# Patient Record
(demographics unavailable — no encounter records)

---

## 2024-11-05 NOTE — DISCUSSION/SUMMARY
[de-identified] : - we again discussed the etiology/ pathophysiology of the patient's complaints today in layman's terms - we reviewed MRI findings and images again today - reviewed treatment options, conservative and operative as well as the indications for each - discussed conservative treatment options including the role for anti-inflammatory medications, injections, bracing and therapy - reviewed risks, benefits and alternatives to these - activity modifications - continue OT - wean from brace - NSAIDs prn pain, Mobic Rx sent today - f/u in 3 weeks   My cumulative time spent on this patient's visit included: Preparation for the visit, review of the medical records, review of pertinent diagnostic studies, examination and counseling of the patient on the above diagnosis, treatment plan and prognosis, orders of diagnostic tests, medications and/or appropriate procedures and documentation in the medical records of today's visit.

## 2024-11-05 NOTE — DISCUSSION/SUMMARY
[de-identified] : - we again discussed the etiology/ pathophysiology of the patient's complaints today in layman's terms - we reviewed MRI findings and images again today - reviewed treatment options, conservative and operative as well as the indications for each - discussed conservative treatment options including the role for anti-inflammatory medications, injections, bracing and therapy - reviewed risks, benefits and alternatives to these - activity modifications - continue OT - wean from brace - NSAIDs prn pain, Mobic Rx sent today - f/u in 3 weeks   My cumulative time spent on this patient's visit included: Preparation for the visit, review of the medical records, review of pertinent diagnostic studies, examination and counseling of the patient on the above diagnosis, treatment plan and prognosis, orders of diagnostic tests, medications and/or appropriate procedures and documentation in the medical records of today's visit.

## 2024-11-05 NOTE — IMAGING
[de-identified] : Right wrist No atrophy No ecchymosis, swelling or wounds Normal muscle tone/ bulk NTTP along the first dorsal compartment TTP overlying ECU No obvious ECU snapping or subluxation Full symmetric wrist ROM with pain at terminal motion Mild pain reproduced in the dorsal ulnar head with resisted wrist extension Able to make a painless composite fist +AIN/ PIN/ Ulnar n SILT throughout fingers wwp + Fovea sign + TFCC grind - CMC grind - Ballottement - Piano key sign - Finkelstein's  3 views bilateral wrist: No acute fractures, mild ulnar positive variance MRI right wrist (9/18/24): Pinhole tear central disc TFCC.  Sprain with partial tear sigmoid and styloid attachments of TFCC with evidence of DRUJ effusion.  Sprain with partial tear dorsal distal radial ulnar ligament.  Dorsal offset ulnar in reference to radius suggesting possible joint instability.  Extensor carpi ulnaris tendinosis.

## 2024-11-05 NOTE — HISTORY OF PRESENT ILLNESS
[9] : 9 [0] : 0 [Localized] : localized [Stabbing] : stabbing [Constant] : constant [Household chores] : household chores [Leisure] : leisure [Work] : work [Social interactions] : social interactions [Physical therapy] : physical therapy [Full time] : Work status: full time [Other:____] : [unfilled] [de-identified] : 10/30/24: The patient returns today for repeat evaluation of her right ulnar-sided wrist pain.  She has been working with physical therapy for a month with minimal improvement in her symptoms.  She is increasingly limited due to this pain and has difficulty picking up her toddler.   11/08/2023 ALECIA COVINGTON is here for Location: Bilateral wrists Complaint: The patient reports a long history of a left volar radial wrist mass which is nontender and not especially bothersome.  She saw a dermatologist who recommended orthopedic evaluation.  Overall she is not really bothered by this.  More notably however patient notes left ulnar-sided wrist pain which began roughly 6 months ago without antecedent event.  She notes pain and difficulty with lifting her 86-ybwml-xil son.  Since its onset she believes it is worsened somewhat and now is beginning to interfere with some of her daily activities.  No radiating pain, numbness or tingling. 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

## 2024-11-05 NOTE — IMAGING
[de-identified] : Right wrist No atrophy No ecchymosis, swelling or wounds Normal muscle tone/ bulk NTTP along the first dorsal compartment TTP overlying ECU No obvious ECU snapping or subluxation Full symmetric wrist ROM with pain at terminal motion Mild pain reproduced in the dorsal ulnar head with resisted wrist extension Able to make a painless composite fist +AIN/ PIN/ Ulnar n SILT throughout fingers wwp + Fovea sign + TFCC grind - CMC grind - Ballottement - Piano key sign - Finkelstein's  3 views bilateral wrist: No acute fractures, mild ulnar positive variance MRI right wrist (9/18/24): Pinhole tear central disc TFCC.  Sprain with partial tear sigmoid and styloid attachments of TFCC with evidence of DRUJ effusion.  Sprain with partial tear dorsal distal radial ulnar ligament.  Dorsal offset ulnar in reference to radius suggesting possible joint instability.  Extensor carpi ulnaris tendinosis.

## 2024-11-05 NOTE — HISTORY OF PRESENT ILLNESS
[9] : 9 [0] : 0 [Localized] : localized [Stabbing] : stabbing [Constant] : constant [Household chores] : household chores [Leisure] : leisure [Work] : work [Social interactions] : social interactions [Physical therapy] : physical therapy [Full time] : Work status: full time [Other:____] : [unfilled] [de-identified] : 10/30/24: The patient returns today for repeat evaluation of her right ulnar-sided wrist pain.  She has been working with physical therapy for a month with minimal improvement in her symptoms.  She is increasingly limited due to this pain and has difficulty picking up her toddler.   11/08/2023 ALECIA COVINGTON is here for Location: Bilateral wrists Complaint: The patient reports a long history of a left volar radial wrist mass which is nontender and not especially bothersome.  She saw a dermatologist who recommended orthopedic evaluation.  Overall she is not really bothered by this.  More notably however patient notes left ulnar-sided wrist pain which began roughly 6 months ago without antecedent event.  She notes pain and difficulty with lifting her 31-bosbc-fdr son.  Since its onset she believes it is worsened somewhat and now is beginning to interfere with some of her daily activities.  No radiating pain, numbness or tingling. 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

## 2024-11-22 NOTE — IMAGING
[de-identified] : Right wrist No ecchymosis, swelling or wounds Normal muscle tone/ bulk TTP at the pisiform and pisotriquetral joint NTTP along the first dorsal compartment Minimally TTP overlying ECU No obvious ECU snapping or subluxation Full symmetric wrist ROM No pain reproduced in the dorsal ulnar head with resisted wrist extension Moderate pain reproduced with resisted wrist flexion and ulnar deviation Able to make a painless composite fist +AIN/ PIN/ Ulnar n SILT throughout fingers wwp - Fovea sign - TFCC grind - ECU Synergy test  + Pisotriquetral grind - Ballottement - Piano key sign - Finkelstein's  3 views bilateral wrist: No acute fractures, mild ulnar positive variance MRI right wrist (9/18/24): Pinhole tear central disc TFCC.  Sprain with partial tear sigmoid and styloid attachments of TFCC with evidence of DRUJ effusion.  Sprain with partial tear dorsal distal radial ulnar ligament.  Dorsal offset ulnar in reference to radius suggesting possible joint instability.  Extensor carpi ulnaris tendinosis.

## 2024-11-22 NOTE — HISTORY OF PRESENT ILLNESS
[9] : 9 [0] : 0 [Localized] : localized [Stabbing] : stabbing [Constant] : constant [Household chores] : household chores [Leisure] : leisure [Work] : work [Social interactions] : social interactions [Physical therapy] : physical therapy [Full time] : Work status: full time [Other:____] : [unfilled] [de-identified] : 11/20/24: The patient returns today for repeat evaluation of her right ulnar-sided wrist pain.  She has been working with physical therapy and does see slow progress in terms of her previous pain.   11/08/2023 ALECIA COVINGTON is here for Location: Bilateral wrists Complaint: The patient reports a long history of a left volar radial wrist mass which is nontender and not especially bothersome.  She saw a dermatologist who recommended orthopedic evaluation.  Overall she is not really bothered by this.  More notably however patient notes left ulnar-sided wrist pain which began roughly 6 months ago without antecedent event.  She notes pain and difficulty with lifting her 70-kfqsc-zza son.  Since its onset she believes it is worsened somewhat and now is beginning to interfere with some of her daily activities.  No radiating pain, numbness or tingling. 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

## 2024-11-22 NOTE — HISTORY OF PRESENT ILLNESS
[9] : 9 [0] : 0 [Localized] : localized [Stabbing] : stabbing [Constant] : constant [Household chores] : household chores [Leisure] : leisure [Work] : work [Social interactions] : social interactions [Physical therapy] : physical therapy [Full time] : Work status: full time [Other:____] : [unfilled] [de-identified] : 11/20/24: The patient returns today for repeat evaluation of her right ulnar-sided wrist pain.  She has been working with physical therapy and does see slow progress in terms of her previous pain.   11/08/2023 ALECIA COVINGTON is here for Location: Bilateral wrists Complaint: The patient reports a long history of a left volar radial wrist mass which is nontender and not especially bothersome.  She saw a dermatologist who recommended orthopedic evaluation.  Overall she is not really bothered by this.  More notably however patient notes left ulnar-sided wrist pain which began roughly 6 months ago without antecedent event.  She notes pain and difficulty with lifting her 86-fklun-rek son.  Since its onset she believes it is worsened somewhat and now is beginning to interfere with some of her daily activities.  No radiating pain, numbness or tingling. 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

## 2024-11-22 NOTE — PROCEDURE
[FreeTextEntry2] : Right pisiform bursitis [FreeTextEntry1] : Corticosteroid injection [FreeTextEntry3] : The risks and benefits of steroid injections were discussed. Verbal consent was obtained,  The site was prepped in a sterile fashion with alcohol A combination of 6 mg (1cc) of Celestone and 2cc 1% xylocaine were injected under sterile conditions at the level of the right pisotriquetral joint Patient tolerated the procedure without complication and was counseled on post injection expectations.

## 2024-11-22 NOTE — DISCUSSION/SUMMARY
[de-identified] : - we again discussed the etiology/ pathophysiology of the patient's complaints today in layman's terms - we reviewed MRI findings and images again today - reviewed treatment options, conservative and operative as well as the indications for each - discussed conservative treatment options including the role for anti-inflammatory medications, injections, bracing and therapy - reviewed risks, benefits and alternatives to these - patient requests right wrist CSI, tolerated without complication - activity modifications - continue OT - NSAIDs prn pain, Mobic - f/u in 2 weeks   My cumulative time spent on this patient's visit included: Preparation for the visit, review of the medical records, review of pertinent diagnostic studies, examination and counseling of the patient on the above diagnosis, treatment plan and prognosis, orders of diagnostic tests, medications and/or appropriate procedures and documentation in the medical records of today's visit.

## 2024-11-22 NOTE — IMAGING
[de-identified] : Right wrist No ecchymosis, swelling or wounds Normal muscle tone/ bulk TTP at the pisiform and pisotriquetral joint NTTP along the first dorsal compartment Minimally TTP overlying ECU No obvious ECU snapping or subluxation Full symmetric wrist ROM No pain reproduced in the dorsal ulnar head with resisted wrist extension Moderate pain reproduced with resisted wrist flexion and ulnar deviation Able to make a painless composite fist +AIN/ PIN/ Ulnar n SILT throughout fingers wwp - Fovea sign - TFCC grind - ECU Synergy test  + Pisotriquetral grind - Ballottement - Piano key sign - Finkelstein's  3 views bilateral wrist: No acute fractures, mild ulnar positive variance MRI right wrist (9/18/24): Pinhole tear central disc TFCC.  Sprain with partial tear sigmoid and styloid attachments of TFCC with evidence of DRUJ effusion.  Sprain with partial tear dorsal distal radial ulnar ligament.  Dorsal offset ulnar in reference to radius suggesting possible joint instability.  Extensor carpi ulnaris tendinosis.

## 2024-11-22 NOTE — DISCUSSION/SUMMARY
[de-identified] : - we again discussed the etiology/ pathophysiology of the patient's complaints today in layman's terms - we reviewed MRI findings and images again today - reviewed treatment options, conservative and operative as well as the indications for each - discussed conservative treatment options including the role for anti-inflammatory medications, injections, bracing and therapy - reviewed risks, benefits and alternatives to these - patient requests right wrist CSI, tolerated without complication - activity modifications - continue OT - NSAIDs prn pain, Mobic - f/u in 2 weeks   My cumulative time spent on this patient's visit included: Preparation for the visit, review of the medical records, review of pertinent diagnostic studies, examination and counseling of the patient on the above diagnosis, treatment plan and prognosis, orders of diagnostic tests, medications and/or appropriate procedures and documentation in the medical records of today's visit.

## 2024-12-02 NOTE — PHYSICAL EXAM
[Appropriately responsive] : appropriately responsive [Alert] : alert [No Acute Distress] : no acute distress [Soft] : soft [Non-tender] : non-tender [Non-distended] : non-distended [No Lesions] : no lesions [No Mass] : no mass [Oriented x3] : oriented x3 [Examination Of The Breasts] : a normal appearance [___cm] : a ~M [unfilled] ~Ucm inferior lateral quadrant mass was palpated [Deep] : deep [Firm] : firm [Fixed] : fixed [Nontender] : nontender [5:00] : in the 5:00 position [___cm Radial Distance from the Nipple] : [unfilled] ~Ucm radially from the nipple [No Masses] : no breast masses were palpable [Labia Majora] : normal [Labia Minora] : normal [Normal] : normal [Uterine Adnexae] : normal

## 2024-12-02 NOTE — DISCUSSION/SUMMARY
[FreeTextEntry1] : discussed color testing referred to breast specialist. rto in 1 year or soon for any gyn issues. pt verbalized understanding.

## 2024-12-02 NOTE — HISTORY OF PRESENT ILLNESS
[Patient reported breast sonogram was normal] : Patient reported breast sonogram was normal [Patient reported PAP Smear was normal] : Patient reported PAP Smear was normal [Normal Amount/Duration] :  normal amount and duration [No] : Patient does not have concerns regarding sex [Patient refuses STI testing] : Patient refuses STI testing [FreeTextEntry1] : 29 y/o  female, LMP: 24, presents for annual GYN visit. Patient denies any GYN complaints.  was doing breast sono q 6 mth to watch cysts of left breast.  Last imaging 6 mths ago and was advised no further eval.  Menstrual Cycle: regular, monthly, mild pain and bleeding. Sexual Activity: monogamous with . Contraception: condoms Social/Mental Health: reports social ETOH, denies tobacco or any illicit drug use. Denies depression, anxiety, thoughts of personal harm or suicidal ideation.  ROS: Denies fever/chills, HA, Cough/sore throat, CP, SOB, N/V, Diarrhea/Constipation, Pelvic pain, Urinary frequency/urgency/incontinence, irregular vaginal bleeding, discharge or irritation.  Medical History GYN HX:  2022-retained placenta PMH: denies PSH: denies Meds: none Allergies: NKDA [BreastSonogramDate] : 11/2023 [PapSmeardate] : 11/2023

## 2024-12-10 NOTE — DISCUSSION/SUMMARY
[de-identified] : - we again discussed the etiology/ pathophysiology of the patient's complaints today in layman's terms - we reviewed MRI findings and images again today - reviewed treatment options, conservative and operative as well as the indications for each - discussed conservative treatment options including the role for anti-inflammatory medications, injections, bracing and therapy - reviewed risks, benefits and alternatives to these - activity modifications, gradual return to activities as tolerated - continue OT - NSAIDs prn pain - f/u in 4 weeks, sooner if she has questions or concerns   My cumulative time spent on this patient's visit included: Preparation for the visit, review of the medical records, review of pertinent diagnostic studies, examination and counseling of the patient on the above diagnosis, treatment plan and prognosis, orders of diagnostic tests, medications and/or appropriate procedures and documentation in the medical records of today's visit.

## 2024-12-10 NOTE — IMAGING
[de-identified] : Right wrist No ecchymosis, swelling or wounds Normal muscle tone/ bulk TTP at the pisiform and pisotriquetral joint NTTP along the first dorsal compartment Minimally TTP overlying ECU No obvious ECU snapping or subluxation Full symmetric wrist ROM No pain reproduced in the dorsal ulnar head with resisted wrist extension Moderate pain reproduced with resisted wrist flexion and ulnar deviation Able to make a painless composite fist +AIN/ PIN/ Ulnar n SILT throughout fingers wwp - Fovea sign - TFCC grind - ECU Synergy test + Pisotriquetral grind - Ballottement - Piano key sign - Finkelstein's  3 views bilateral wrist: No acute fractures, mild ulnar positive variance MRI right wrist (9/18/24): Pinhole tear central disc TFCC. Sprain with partial tear sigmoid and styloid attachments of TFCC with evidence of DRUJ effusion. Sprain with partial tear dorsal distal radial ulnar ligament. Dorsal offset ulnar in reference to radius suggesting possible joint instability. Extensor carpi ulnaris tendinosis.

## 2024-12-10 NOTE — HISTORY OF PRESENT ILLNESS
[9] : 9 [0] : 0 [Localized] : localized [Stabbing] : stabbing [Constant] : constant [Household chores] : household chores [Leisure] : leisure [Work] : work [Social interactions] : social interactions [Physical therapy] : physical therapy [Full time] : Work status: full time [Other:____] : [unfilled] [de-identified] : 12/4/24: The patient returns today for a repeat evaluation of her right ulnar-sided wrist pain.  She received a CSI of the right pisotriquetral joint at her last visit and notes modest improvement in her previous pain.  She has been able to return to many of her previous activities with mild pain.  Although she continues to have symptoms she is encouraged by the improvement.  11/08/2023 ALECIA COVINGTON is here for Location: Bilateral wrists Complaint: The patient reports a long history of a left volar radial wrist mass which is nontender and not especially bothersome.  She saw a dermatologist who recommended orthopedic evaluation.  Overall she is not really bothered by this.  More notably however patient notes left ulnar-sided wrist pain which began roughly 6 months ago without antecedent event.  She notes pain and difficulty with lifting her 88-wqopn-tue son.  Since its onset she believes it is worsened somewhat and now is beginning to interfere with some of her daily activities.  No radiating pain, numbness or tingling. 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

## 2024-12-10 NOTE — HISTORY OF PRESENT ILLNESS
[9] : 9 [0] : 0 [Localized] : localized [Stabbing] : stabbing [Constant] : constant [Household chores] : household chores [Leisure] : leisure [Work] : work [Social interactions] : social interactions [Physical therapy] : physical therapy [Full time] : Work status: full time [Other:____] : [unfilled] [de-identified] : 12/4/24: The patient returns today for a repeat evaluation of her right ulnar-sided wrist pain.  She received a CSI of the right pisotriquetral joint at her last visit and notes modest improvement in her previous pain.  She has been able to return to many of her previous activities with mild pain.  Although she continues to have symptoms she is encouraged by the improvement.  11/08/2023 ALECIA COVINGTON is here for Location: Bilateral wrists Complaint: The patient reports a long history of a left volar radial wrist mass which is nontender and not especially bothersome.  She saw a dermatologist who recommended orthopedic evaluation.  Overall she is not really bothered by this.  More notably however patient notes left ulnar-sided wrist pain which began roughly 6 months ago without antecedent event.  She notes pain and difficulty with lifting her 78-lnadl-rrv son.  Since its onset she believes it is worsened somewhat and now is beginning to interfere with some of her daily activities.  No radiating pain, numbness or tingling. 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

## 2024-12-10 NOTE — IMAGING
[de-identified] : Right wrist No ecchymosis, swelling or wounds Normal muscle tone/ bulk TTP at the pisiform and pisotriquetral joint NTTP along the first dorsal compartment Minimally TTP overlying ECU No obvious ECU snapping or subluxation Full symmetric wrist ROM No pain reproduced in the dorsal ulnar head with resisted wrist extension Moderate pain reproduced with resisted wrist flexion and ulnar deviation Able to make a painless composite fist +AIN/ PIN/ Ulnar n SILT throughout fingers wwp - Fovea sign - TFCC grind - ECU Synergy test + Pisotriquetral grind - Ballottement - Piano key sign - Finkelstein's  3 views bilateral wrist: No acute fractures, mild ulnar positive variance MRI right wrist (9/18/24): Pinhole tear central disc TFCC. Sprain with partial tear sigmoid and styloid attachments of TFCC with evidence of DRUJ effusion. Sprain with partial tear dorsal distal radial ulnar ligament. Dorsal offset ulnar in reference to radius suggesting possible joint instability. Extensor carpi ulnaris tendinosis.

## 2024-12-10 NOTE — DISCUSSION/SUMMARY
[de-identified] : - we again discussed the etiology/ pathophysiology of the patient's complaints today in layman's terms - we reviewed MRI findings and images again today - reviewed treatment options, conservative and operative as well as the indications for each - discussed conservative treatment options including the role for anti-inflammatory medications, injections, bracing and therapy - reviewed risks, benefits and alternatives to these - activity modifications, gradual return to activities as tolerated - continue OT - NSAIDs prn pain - f/u in 4 weeks, sooner if she has questions or concerns   My cumulative time spent on this patient's visit included: Preparation for the visit, review of the medical records, review of pertinent diagnostic studies, examination and counseling of the patient on the above diagnosis, treatment plan and prognosis, orders of diagnostic tests, medications and/or appropriate procedures and documentation in the medical records of today's visit.

## 2025-05-15 NOTE — PLAN
[FreeTextEntry1] : new ob labs and GC collected  genetic screening on file so need for repeat  pt was on asa in prior preg due to low marlys-a  opts to use for current pregnancy  Rx sent, to start at 12 wks  NIPT collected today  RTO in 3 wks for NT Scan  [Parent(s)] : parent(s) [Sister] : sister [Pre-] : Pre- [None] : none [Smokers in Household] : there are no smokers in the home

## 2025-05-15 NOTE — HISTORY OF PRESENT ILLNESS
[TextBox_4] : pt presents for pregnancy confirmation  no ob complaints  pregnancy unplanned but desired  prior term , uncomplicated